# Patient Record
Sex: MALE | Race: WHITE | NOT HISPANIC OR LATINO | Employment: FULL TIME | URBAN - NONMETROPOLITAN AREA
[De-identification: names, ages, dates, MRNs, and addresses within clinical notes are randomized per-mention and may not be internally consistent; named-entity substitution may affect disease eponyms.]

---

## 2019-01-11 ENCOUNTER — TELEPHONE (OUTPATIENT)
Dept: SURGERY | Facility: CLINIC | Age: 54
End: 2019-01-11

## 2019-01-11 RX ORDER — POLYETHYLENE GLYCOL 1450
1 POWDER (GRAM) MISCELLANEOUS
Qty: 238 G | Refills: 0 | Status: SHIPPED | OUTPATIENT
Start: 2019-01-11 | End: 2019-01-11

## 2019-01-11 RX ORDER — BISACODYL 5 MG/1
10 TABLET, DELAYED RELEASE ORAL 2 TIMES DAILY
Qty: 4 TABLET | Refills: 0 | Status: SHIPPED | OUTPATIENT
Start: 2019-01-11 | End: 2019-01-12

## 2019-01-16 ENCOUNTER — PREP FOR SURGERY (OUTPATIENT)
Dept: OTHER | Facility: HOSPITAL | Age: 54
End: 2019-01-16

## 2019-01-16 DIAGNOSIS — Z12.11 SCREEN FOR COLON CANCER: Primary | ICD-10-CM

## 2019-01-21 PROBLEM — Z12.11 SCREEN FOR COLON CANCER: Status: ACTIVE | Noted: 2019-01-21

## 2019-01-28 ENCOUNTER — TELEPHONE (OUTPATIENT)
Dept: SURGERY | Facility: CLINIC | Age: 54
End: 2019-01-28

## 2019-01-29 RX ORDER — MULTIPLE VITAMINS W/ MINERALS TAB 9MG-400MCG
1 TAB ORAL DAILY
COMMUNITY

## 2019-01-29 RX ORDER — ASPIRIN 325 MG
325 TABLET ORAL DAILY
COMMUNITY
End: 2022-06-29 | Stop reason: HOSPADM

## 2019-01-29 RX ORDER — PAROXETINE 10 MG/1
10 TABLET, FILM COATED ORAL EVERY MORNING
COMMUNITY

## 2019-01-29 RX ORDER — VITAMIN B COMPLEX
1 CAPSULE ORAL DAILY
COMMUNITY

## 2019-01-30 ENCOUNTER — HOSPITAL ENCOUNTER (OUTPATIENT)
Facility: HOSPITAL | Age: 54
Setting detail: HOSPITAL OUTPATIENT SURGERY
Discharge: HOME | End: 2019-01-30
Attending: SURGERY | Admitting: SURGERY

## 2019-01-30 ENCOUNTER — ANESTHESIA (OUTPATIENT)
Dept: GASTROENTEROLOGY | Facility: HOSPITAL | Age: 54
End: 2019-01-30

## 2019-01-30 ENCOUNTER — ANESTHESIA EVENT (OUTPATIENT)
Dept: GASTROENTEROLOGY | Facility: HOSPITAL | Age: 54
End: 2019-01-30

## 2019-01-30 VITALS
SYSTOLIC BLOOD PRESSURE: 114 MMHG | RESPIRATION RATE: 18 BRPM | HEART RATE: 66 BPM | WEIGHT: 285 LBS | OXYGEN SATURATION: 98 % | TEMPERATURE: 97.8 F | HEIGHT: 75 IN | DIASTOLIC BLOOD PRESSURE: 71 MMHG | BODY MASS INDEX: 35.43 KG/M2

## 2019-01-30 DIAGNOSIS — Z12.11 SCREEN FOR COLON CANCER: ICD-10-CM

## 2019-01-30 PROCEDURE — 25010000002 PROPOFOL 10 MG/ML EMULSION: Performed by: NURSE ANESTHETIST, CERTIFIED REGISTERED

## 2019-01-30 PROCEDURE — S0260 H&P FOR SURGERY: HCPCS | Performed by: SURGERY

## 2019-01-30 RX ORDER — LIDOCAINE HYDROCHLORIDE 20 MG/ML
INJECTION, SOLUTION INFILTRATION; PERINEURAL AS NEEDED
Status: DISCONTINUED | OUTPATIENT
Start: 2019-01-30 | End: 2019-01-30 | Stop reason: SURG

## 2019-01-30 RX ORDER — SODIUM CHLORIDE, SODIUM LACTATE, POTASSIUM CHLORIDE, CALCIUM CHLORIDE 600; 310; 30; 20 MG/100ML; MG/100ML; MG/100ML; MG/100ML
1000 INJECTION, SOLUTION INTRAVENOUS CONTINUOUS
Status: DISCONTINUED | OUTPATIENT
Start: 2019-01-30 | End: 2019-01-30 | Stop reason: HOSPADM

## 2019-01-30 RX ORDER — SODIUM CHLORIDE 0.9 % (FLUSH) 0.9 %
3 SYRINGE (ML) INJECTION AS NEEDED
Status: DISCONTINUED | OUTPATIENT
Start: 2019-01-30 | End: 2019-01-30 | Stop reason: HOSPADM

## 2019-01-30 RX ORDER — PROPOFOL 10 MG/ML
VIAL (ML) INTRAVENOUS AS NEEDED
Status: DISCONTINUED | OUTPATIENT
Start: 2019-01-30 | End: 2019-01-30 | Stop reason: SURG

## 2019-01-30 RX ORDER — ONDANSETRON 2 MG/ML
4 INJECTION INTRAMUSCULAR; INTRAVENOUS ONCE AS NEEDED
Status: CANCELLED | OUTPATIENT
Start: 2019-01-30 | End: 2019-01-30

## 2019-01-30 RX ADMIN — LIDOCAINE HYDROCHLORIDE 100 MG: 20 INJECTION, SOLUTION INFILTRATION; PERINEURAL at 09:54

## 2019-01-30 RX ADMIN — SODIUM CHLORIDE, POTASSIUM CHLORIDE, SODIUM LACTATE AND CALCIUM CHLORIDE 1000 ML: 600; 310; 30; 20 INJECTION, SOLUTION INTRAVENOUS at 07:51

## 2019-01-30 RX ADMIN — PROPOFOL 400 MG: 10 INJECTION, EMULSION INTRAVENOUS at 10:05

## 2019-01-30 NOTE — ANESTHESIA PREPROCEDURE EVALUATION
Anesthesia Evaluation     Patient summary reviewed and Nursing notes reviewed   no history of anesthetic complications:  NPO Solid Status: > 8 hours  NPO Liquid Status: > 8 hours           Airway   Mallampati: II  TM distance: >3 FB  Possible difficult intubation and Large neck circumference  Dental    (+) partials    Pulmonary    (+) shortness of breath, sleep apnea, decreased breath sounds,   (-) not a smoker  Cardiovascular     PT is on anticoagulation therapy    CAD:  inc risk morbid obese.      Neuro/Psych  GI/Hepatic/Renal/Endo    (+) obesity, morbid obesity, GI bleeding, diabetes mellitus type 2 well controlled,     ROS Comment: Gastric sleeve    Musculoskeletal     (+) arthralgias, myalgias,   Abdominal   (+) obese,    Substance History      OB/GYN          Other                        Anesthesia Plan    ASA 3     MAC   (Risks and benefits discussed including risk of aspiration, recall and dental damage. All patient questions answered. Will continue with POC.)  intravenous induction   Anesthetic plan, all risks, benefits, and alternatives have been provided, discussed and informed consent has been obtained with: patient.

## 2019-01-30 NOTE — ANESTHESIA POSTPROCEDURE EVALUATION
Patient: Guanaco Herman    Procedure Summary     Date:  01/30/19 Room / Location:  [facility] ENDOSCOPY 3 / [facility] ENDOSCOPY    Anesthesia Start:  0951 Anesthesia Stop:  1015    Procedure:  COLONOSCOPY with hot forcep polypectomy (N/A ) Diagnosis:       Screen for colon cancer      (Screen for colon cancer [Z12.11])    Surgeon:  Ahmet Thomas MD Provider:  Aj Whittington CRNA    Anesthesia Type:  MAC ASA Status:  3          Anesthesia Type: MAC  Last vitals  BP   114/71 (01/30/19 1048)   Temp   97.8 °F (36.6 °C) (01/30/19 1018)   Pulse   66 (01/30/19 1048)   Resp   18 (01/30/19 1048)     SpO2   98 % (01/30/19 1048)     Post Anesthesia Care and Evaluation    Patient location during evaluation: PHASE II  Patient participation: complete - patient participated  Level of consciousness: awake  Pain score: 1  Pain management: adequate  Airway patency: patent  Anesthetic complications: No anesthetic complications  PONV Status: controlled  Cardiovascular status: acceptable and stable  Respiratory status: acceptable  Hydration status: acceptable

## 2019-02-04 LAB
LAB AP CASE REPORT: NORMAL
PATH REPORT.FINAL DX SPEC: NORMAL

## 2021-01-04 ENCOUNTER — TRANSCRIBE ORDERS (OUTPATIENT)
Dept: LAB | Facility: HOSPITAL | Age: 56
End: 2021-01-04

## 2021-01-04 ENCOUNTER — LAB (OUTPATIENT)
Dept: LAB | Facility: HOSPITAL | Age: 56
End: 2021-01-04

## 2021-01-04 DIAGNOSIS — K21.9 CHRONIC GERD: ICD-10-CM

## 2021-01-04 DIAGNOSIS — Z12.5 PROSTATE CANCER SCREENING: ICD-10-CM

## 2021-01-04 DIAGNOSIS — F32.A DEPRESSION, UNSPECIFIED DEPRESSION TYPE: ICD-10-CM

## 2021-01-04 DIAGNOSIS — K21.9 CHRONIC GERD: Primary | ICD-10-CM

## 2021-01-04 LAB
25(OH)D3 SERPL-MCNC: 25.7 NG/ML (ref 30–100)
ALBUMIN SERPL-MCNC: 3.9 G/DL (ref 3.5–5.2)
ALBUMIN/GLOB SERPL: 1.4 G/DL
ALP SERPL-CCNC: 106 U/L (ref 39–117)
ALT SERPL W P-5'-P-CCNC: 13 U/L (ref 1–41)
ANION GAP SERPL CALCULATED.3IONS-SCNC: 7.4 MMOL/L (ref 5–15)
AST SERPL-CCNC: 16 U/L (ref 1–40)
BACTERIA UR QL AUTO: ABNORMAL /HPF
BASOPHILS # BLD AUTO: 0.06 10*3/MM3 (ref 0–0.2)
BASOPHILS NFR BLD AUTO: 1 % (ref 0–1.5)
BILIRUB SERPL-MCNC: 0.3 MG/DL (ref 0–1.2)
BILIRUB UR QL STRIP: NEGATIVE
BUN SERPL-MCNC: 11 MG/DL (ref 6–20)
BUN/CREAT SERPL: 14.5 (ref 7–25)
CALCIUM SPEC-SCNC: 9 MG/DL (ref 8.6–10.5)
CHLORIDE SERPL-SCNC: 105 MMOL/L (ref 98–107)
CHOLEST SERPL-MCNC: 167 MG/DL (ref 0–200)
CLARITY UR: CLEAR
CO2 SERPL-SCNC: 28.6 MMOL/L (ref 22–29)
COLOR UR: YELLOW
CREAT SERPL-MCNC: 0.76 MG/DL (ref 0.76–1.27)
DEPRECATED RDW RBC AUTO: 41.6 FL (ref 37–54)
EOSINOPHIL # BLD AUTO: 0.34 10*3/MM3 (ref 0–0.4)
EOSINOPHIL NFR BLD AUTO: 5.8 % (ref 0.3–6.2)
ERYTHROCYTE [DISTWIDTH] IN BLOOD BY AUTOMATED COUNT: 13.4 % (ref 12.3–15.4)
GFR SERPL CREATININE-BSD FRML MDRD: 107 ML/MIN/1.73
GLOBULIN UR ELPH-MCNC: 2.7 GM/DL
GLUCOSE SERPL-MCNC: 110 MG/DL (ref 65–99)
GLUCOSE UR STRIP-MCNC: NEGATIVE MG/DL
HCT VFR BLD AUTO: 37.5 % (ref 37.5–51)
HDLC SERPL-MCNC: 57 MG/DL (ref 40–60)
HGB BLD-MCNC: 12.8 G/DL (ref 13–17.7)
HGB UR QL STRIP.AUTO: NEGATIVE
HYALINE CASTS UR QL AUTO: ABNORMAL /LPF
IMM GRANULOCYTES # BLD AUTO: 0.03 10*3/MM3 (ref 0–0.05)
IMM GRANULOCYTES NFR BLD AUTO: 0.5 % (ref 0–0.5)
KETONES UR QL STRIP: ABNORMAL
LDLC SERPL CALC-MCNC: 79 MG/DL (ref 0–100)
LDLC/HDLC SERPL: 1.29 {RATIO}
LEUKOCYTE ESTERASE UR QL STRIP.AUTO: NEGATIVE
LYMPHOCYTES # BLD AUTO: 1.58 10*3/MM3 (ref 0.7–3.1)
LYMPHOCYTES NFR BLD AUTO: 26.7 % (ref 19.6–45.3)
MCH RBC QN AUTO: 29.4 PG (ref 26.6–33)
MCHC RBC AUTO-ENTMCNC: 34.1 G/DL (ref 31.5–35.7)
MCV RBC AUTO: 86 FL (ref 79–97)
MONOCYTES # BLD AUTO: 0.48 10*3/MM3 (ref 0.1–0.9)
MONOCYTES NFR BLD AUTO: 8.1 % (ref 5–12)
NEUTROPHILS NFR BLD AUTO: 3.42 10*3/MM3 (ref 1.7–7)
NEUTROPHILS NFR BLD AUTO: 57.9 % (ref 42.7–76)
NITRITE UR QL STRIP: NEGATIVE
NRBC BLD AUTO-RTO: 0 /100 WBC (ref 0–0.2)
PH UR STRIP.AUTO: 6.5 [PH] (ref 5–8)
PLATELET # BLD AUTO: 204 10*3/MM3 (ref 140–450)
PMV BLD AUTO: 11 FL (ref 6–12)
POTASSIUM SERPL-SCNC: 4.2 MMOL/L (ref 3.5–5.2)
PROT SERPL-MCNC: 6.6 G/DL (ref 6–8.5)
PROT UR QL STRIP: NEGATIVE
PSA SERPL-MCNC: 2.19 NG/ML (ref 0–4)
RBC # BLD AUTO: 4.36 10*6/MM3 (ref 4.14–5.8)
RBC # UR: ABNORMAL /HPF
REF LAB TEST METHOD: ABNORMAL
SODIUM SERPL-SCNC: 141 MMOL/L (ref 136–145)
SP GR UR STRIP: 1.02 (ref 1–1.03)
SQUAMOUS #/AREA URNS HPF: ABNORMAL /HPF
T3 SERPL-MCNC: 116 NG/DL (ref 80–200)
T4 FREE SERPL-MCNC: 0.91 NG/DL (ref 0.93–1.7)
TRIGL SERPL-MCNC: 182 MG/DL (ref 0–150)
TSH SERPL DL<=0.05 MIU/L-ACNC: 2.34 UIU/ML (ref 0.27–4.2)
UROBILINOGEN UR QL STRIP: ABNORMAL
VLDLC SERPL-MCNC: 31 MG/DL (ref 5–40)
WBC # BLD AUTO: 5.91 10*3/MM3 (ref 3.4–10.8)
WBC UR QL AUTO: ABNORMAL /HPF

## 2021-01-04 PROCEDURE — G0103 PSA SCREENING: HCPCS

## 2021-01-04 PROCEDURE — 80061 LIPID PANEL: CPT

## 2021-01-04 PROCEDURE — 81001 URINALYSIS AUTO W/SCOPE: CPT

## 2021-01-04 PROCEDURE — 85025 COMPLETE CBC W/AUTO DIFF WBC: CPT

## 2021-01-04 PROCEDURE — 84443 ASSAY THYROID STIM HORMONE: CPT

## 2021-01-04 PROCEDURE — 80053 COMPREHEN METABOLIC PANEL: CPT

## 2021-01-04 PROCEDURE — 82306 VITAMIN D 25 HYDROXY: CPT

## 2021-01-04 PROCEDURE — 36415 COLL VENOUS BLD VENIPUNCTURE: CPT

## 2021-01-04 PROCEDURE — 84480 ASSAY TRIIODOTHYRONINE (T3): CPT

## 2021-01-04 PROCEDURE — 84439 ASSAY OF FREE THYROXINE: CPT

## 2021-03-10 ENCOUNTER — IMMUNIZATION (OUTPATIENT)
Dept: VACCINE CLINIC | Facility: HOSPITAL | Age: 56
End: 2021-03-10

## 2021-03-10 PROCEDURE — 0001A: CPT | Performed by: INTERNAL MEDICINE

## 2021-03-10 PROCEDURE — 91300 HC SARSCOV02 VAC 30MCG/0.3ML IM: CPT | Performed by: INTERNAL MEDICINE

## 2021-04-06 ENCOUNTER — IMMUNIZATION (OUTPATIENT)
Dept: VACCINE CLINIC | Facility: HOSPITAL | Age: 56
End: 2021-04-06

## 2021-04-06 PROCEDURE — 91300 HC SARSCOV02 VAC 30MCG/0.3ML IM: CPT | Performed by: INTERNAL MEDICINE

## 2021-04-06 PROCEDURE — 0002A: CPT | Performed by: INTERNAL MEDICINE

## 2022-05-03 ENCOUNTER — TELEPHONE (OUTPATIENT)
Dept: SURGERY | Facility: CLINIC | Age: 57
End: 2022-05-03

## 2022-05-31 RX ORDER — POLYETHYLENE GLYCOL 3350 17 G/17G
238 POWDER, FOR SOLUTION ORAL ONCE
Qty: 238 G | Refills: 0 | Status: SHIPPED | OUTPATIENT
Start: 2022-05-31 | End: 2022-05-31

## 2022-05-31 RX ORDER — BISACODYL 5 MG
TABLET, DELAYED RELEASE (ENTERIC COATED) ORAL
Qty: 4 TABLET | Refills: 0 | Status: SHIPPED | OUTPATIENT
Start: 2022-05-31

## 2022-05-31 NOTE — TELEPHONE ENCOUNTER
"Pt scheduled for open access screening colonoscopy @ [facility] 06/29/2022.  Pt stated that there have been no changes in his health history since his last colonoscopy and he is \"not having any problems at this time\".  "

## 2022-06-01 ENCOUNTER — PREP FOR SURGERY (OUTPATIENT)
Dept: OTHER | Facility: HOSPITAL | Age: 57
End: 2022-06-01

## 2022-06-01 DIAGNOSIS — Z12.11 SCREENING FOR COLON CANCER: Primary | ICD-10-CM

## 2022-06-24 ENCOUNTER — TELEPHONE (OUTPATIENT)
Dept: SURGERY | Facility: CLINIC | Age: 57
End: 2022-06-24

## 2022-06-24 NOTE — TELEPHONE ENCOUNTER
I talked with pt, he wanted to know if he can return to work the day after his colonoscopy, I told him he may return to work the day after his colonoscopy.

## 2022-06-27 RX ORDER — ASPIRIN 81 MG/1
81 TABLET, CHEWABLE ORAL DAILY
COMMUNITY

## 2022-06-27 RX ORDER — ROSUVASTATIN CALCIUM 10 MG/1
10 TABLET, COATED ORAL DAILY
COMMUNITY

## 2022-06-29 ENCOUNTER — HOSPITAL ENCOUNTER (OUTPATIENT)
Facility: HOSPITAL | Age: 57
Setting detail: HOSPITAL OUTPATIENT SURGERY
Discharge: HOME | End: 2022-06-29
Attending: SURGERY | Admitting: SURGERY

## 2022-06-29 ENCOUNTER — ANESTHESIA EVENT (OUTPATIENT)
Dept: GASTROENTEROLOGY | Facility: HOSPITAL | Age: 57
End: 2022-06-29

## 2022-06-29 ENCOUNTER — ANESTHESIA (OUTPATIENT)
Dept: GASTROENTEROLOGY | Facility: HOSPITAL | Age: 57
End: 2022-06-29

## 2022-06-29 VITALS
BODY MASS INDEX: 36.31 KG/M2 | WEIGHT: 292 LBS | HEIGHT: 75 IN | SYSTOLIC BLOOD PRESSURE: 118 MMHG | DIASTOLIC BLOOD PRESSURE: 62 MMHG | OXYGEN SATURATION: 97 % | RESPIRATION RATE: 16 BRPM | TEMPERATURE: 97.1 F | HEART RATE: 60 BPM

## 2022-06-29 DIAGNOSIS — Z12.11 SCREENING FOR COLON CANCER: ICD-10-CM

## 2022-06-29 PROCEDURE — 25010000002 FENTANYL CITRATE (PF) 50 MCG/ML SOLUTION: Performed by: NURSE ANESTHETIST, CERTIFIED REGISTERED

## 2022-06-29 PROCEDURE — 25010000002 PROPOFOL 10 MG/ML EMULSION: Performed by: NURSE ANESTHETIST, CERTIFIED REGISTERED

## 2022-06-29 PROCEDURE — 25010000002 MIDAZOLAM PER 1MG: Performed by: NURSE ANESTHETIST, CERTIFIED REGISTERED

## 2022-06-29 PROCEDURE — 45384 COLONOSCOPY W/LESION REMOVAL: CPT | Performed by: SURGERY

## 2022-06-29 PROCEDURE — 45380 COLONOSCOPY AND BIOPSY: CPT | Performed by: SURGERY

## 2022-06-29 PROCEDURE — S0260 H&P FOR SURGERY: HCPCS | Performed by: SURGERY

## 2022-06-29 RX ORDER — SODIUM CHLORIDE, SODIUM LACTATE, POTASSIUM CHLORIDE, CALCIUM CHLORIDE 600; 310; 30; 20 MG/100ML; MG/100ML; MG/100ML; MG/100ML
INJECTION, SOLUTION INTRAVENOUS CONTINUOUS PRN
Status: DISCONTINUED | OUTPATIENT
Start: 2022-06-29 | End: 2022-06-29 | Stop reason: SURG

## 2022-06-29 RX ORDER — SODIUM CHLORIDE 0.9 % (FLUSH) 0.9 %
10 SYRINGE (ML) INJECTION AS NEEDED
Status: DISCONTINUED | OUTPATIENT
Start: 2022-06-29 | End: 2022-06-29 | Stop reason: HOSPADM

## 2022-06-29 RX ORDER — FENTANYL CITRATE 50 UG/ML
INJECTION, SOLUTION INTRAMUSCULAR; INTRAVENOUS AS NEEDED
Status: DISCONTINUED | OUTPATIENT
Start: 2022-06-29 | End: 2022-06-29 | Stop reason: SURG

## 2022-06-29 RX ORDER — SODIUM CHLORIDE, SODIUM LACTATE, POTASSIUM CHLORIDE, CALCIUM CHLORIDE 600; 310; 30; 20 MG/100ML; MG/100ML; MG/100ML; MG/100ML
1000 INJECTION, SOLUTION INTRAVENOUS CONTINUOUS
Status: DISCONTINUED | OUTPATIENT
Start: 2022-06-29 | End: 2022-06-29 | Stop reason: HOSPADM

## 2022-06-29 RX ORDER — PROPOFOL 10 MG/ML
VIAL (ML) INTRAVENOUS AS NEEDED
Status: DISCONTINUED | OUTPATIENT
Start: 2022-06-29 | End: 2022-06-29 | Stop reason: SURG

## 2022-06-29 RX ORDER — MIDAZOLAM HYDROCHLORIDE 2 MG/2ML
INJECTION, SOLUTION INTRAMUSCULAR; INTRAVENOUS AS NEEDED
Status: DISCONTINUED | OUTPATIENT
Start: 2022-06-29 | End: 2022-06-29 | Stop reason: SURG

## 2022-06-29 RX ORDER — KETAMINE HCL IN NACL, ISO-OSM 100MG/10ML
SYRINGE (ML) INJECTION AS NEEDED
Status: DISCONTINUED | OUTPATIENT
Start: 2022-06-29 | End: 2022-06-29 | Stop reason: SURG

## 2022-06-29 RX ADMIN — PROPOFOL 30 MG: 10 INJECTION, EMULSION INTRAVENOUS at 07:41

## 2022-06-29 RX ADMIN — SODIUM CHLORIDE, POTASSIUM CHLORIDE, SODIUM LACTATE AND CALCIUM CHLORIDE: 600; 310; 30; 20 INJECTION, SOLUTION INTRAVENOUS at 07:14

## 2022-06-29 RX ADMIN — FENTANYL CITRATE 50 MCG: 50 INJECTION, SOLUTION INTRAMUSCULAR; INTRAVENOUS at 07:41

## 2022-06-29 RX ADMIN — MIDAZOLAM HYDROCHLORIDE 2 MG: 1 INJECTION, SOLUTION INTRAMUSCULAR; INTRAVENOUS at 07:35

## 2022-06-29 RX ADMIN — Medication 25 MG: at 07:38

## 2022-06-29 RX ADMIN — FENTANYL CITRATE 50 MCG: 50 INJECTION, SOLUTION INTRAMUSCULAR; INTRAVENOUS at 07:36

## 2022-06-29 RX ADMIN — SODIUM CHLORIDE, POTASSIUM CHLORIDE, SODIUM LACTATE AND CALCIUM CHLORIDE 1000 ML: 600; 310; 30; 20 INJECTION, SOLUTION INTRAVENOUS at 06:35

## 2022-06-29 RX ADMIN — PROPOFOL 30 MG: 10 INJECTION, EMULSION INTRAVENOUS at 07:44

## 2022-06-29 RX ADMIN — PROPOFOL 30 MG: 10 INJECTION, EMULSION INTRAVENOUS at 07:39

## 2022-06-29 NOTE — ANESTHESIA PREPROCEDURE EVALUATION
Anesthesia Evaluation     Patient summary reviewed and Nursing notes reviewed   no history of anesthetic complications:  NPO Solid Status: > 8 hours  NPO Liquid Status: > 8 hours           Airway   Mallampati: II  TM distance: >3 FB  Possible difficult intubation, Large neck circumference and Difficult intubation highly probable  Dental    (+) partials    Pulmonary    (+) shortness of breath, sleep apnea, decreased breath sounds,   (-) not a smoker  Cardiovascular     PT is on anticoagulation therapy    (+) hypertension, [place], PVD, hyperlipidemia,   CAD:  inc risk morbid obese.      Neuro/Psych  GI/Hepatic/Renal/Endo    (+) obesity, morbid obesity, GI bleeding , diabetes mellitus type 2 well controlled,     ROS Comment: Gastric sleeve    Musculoskeletal     (+) arthralgias, back pain, chronic pain, myalgias,   Abdominal   (+) obese,    Substance History      OB/GYN          Other                          Anesthesia Plan    ASA 3     MAC     (Risks and benefits discussed including risk of aspiration, recall and dental damage. All patient questions answered. Will continue with POC.)  intravenous induction     Anesthetic plan, risks, benefits, and alternatives have been provided, discussed and informed consent has been obtained with: patient.

## 2022-06-29 NOTE — ANESTHESIA POSTPROCEDURE EVALUATION
Patient: Guanaco Herman    Procedure Summary     Date: 06/29/22 Room / Location: [facility] ENDOSCOPY 3 / [facility] ENDOSCOPY    Anesthesia Start: 0729 Anesthesia Stop:     Procedure: COLONOSCOPY, polypectomy (N/A ) Diagnosis:       Screening for colon cancer      (Screening for colon cancer [Z12.11])    Surgeons: Ahmet Thomas MD Provider: Ramiro Hook CRNA    Anesthesia Type: MAC ASA Status: 3          Anesthesia Type: MAC    Vitals  No vitals data found for the desired time range.          Post Anesthesia Care and Evaluation    Patient location during evaluation: PHASE II  Patient participation: complete - patient participated  Level of consciousness: awake  Pain score: 0  Pain management: adequate    Airway patency: patent  Anesthetic complications: No anesthetic complications  PONV Status: none  Cardiovascular status: acceptable  Respiratory status: acceptable  Hydration status: acceptable    Comments: vsss resp spont, reflexes intact, responsive, report given to pacu nurse.  See R.N. note for postop vital signs.

## 2022-06-30 LAB — REF LAB TEST METHOD: NORMAL

## 2025-04-01 ENCOUNTER — TELEPHONE (OUTPATIENT)
Age: 60
End: 2025-04-01

## 2025-04-01 NOTE — TELEPHONE ENCOUNTER
Caller: Guanaco Herman    Relationship to patient: Self    Best call back number: [phone]     Chief complaint: NO CHIEF COMPLAINT - REQUESTING PHYSICAL     Type of visit: NEW PATIENT WITH DR RUVALCABA     Requested date: ASAP PLEASE - WHEN AVAILABLE      If rescheduling, when is the original appointment: N/A NO AVAILABILITY     Additional notes:THE PATIENT STATED THAT HIS PROVIDER RETIRED AND THAT DR RUVALCABA WAS HIGHLY RECOMMENDED BY SOMEONE (REPORTS HE IS FROM [place], [place]).     PLEASE CALL AND ADVISE.

## 2025-05-13 ENCOUNTER — OFFICE VISIT (OUTPATIENT)
Age: 60
End: 2025-05-13
Payer: COMMERCIAL

## 2025-05-13 VITALS
SYSTOLIC BLOOD PRESSURE: 130 MMHG | DIASTOLIC BLOOD PRESSURE: 90 MMHG | HEART RATE: 67 BPM | WEIGHT: 304 LBS | HEIGHT: 75 IN | OXYGEN SATURATION: 97 % | BODY MASS INDEX: 37.8 KG/M2

## 2025-05-13 DIAGNOSIS — M15.0 PRIMARY OSTEOARTHRITIS INVOLVING MULTIPLE JOINTS: ICD-10-CM

## 2025-05-13 DIAGNOSIS — Z12.5 PROSTATE CANCER SCREENING: ICD-10-CM

## 2025-05-13 DIAGNOSIS — F41.1 GENERALIZED ANXIETY DISORDER: ICD-10-CM

## 2025-05-13 DIAGNOSIS — R03.0 ELEVATED BLOOD PRESSURE READING WITHOUT DIAGNOSIS OF HYPERTENSION: Primary | ICD-10-CM

## 2025-05-13 DIAGNOSIS — M25.50 POLYARTHRALGIA: ICD-10-CM

## 2025-05-13 DIAGNOSIS — E78.5 DYSLIPIDEMIA: ICD-10-CM

## 2025-05-13 LAB
BASOPHILS # BLD AUTO: 0.06 10*3/MM3 (ref 0–0.2)
BASOPHILS NFR BLD AUTO: 0.8 % (ref 0–1.5)
DEPRECATED RDW RBC AUTO: 43.6 FL (ref 37–54)
EOSINOPHIL # BLD AUTO: 0.47 10*3/MM3 (ref 0–0.4)
EOSINOPHIL NFR BLD AUTO: 6.4 % (ref 0.3–6.2)
ERYTHROCYTE [DISTWIDTH] IN BLOOD BY AUTOMATED COUNT: 15 % (ref 12.3–15.4)
HCT VFR BLD AUTO: 35.4 % (ref 37.5–51)
HGB BLD-MCNC: 11.4 G/DL (ref 13–17.7)
IMM GRANULOCYTES # BLD AUTO: 0.04 10*3/MM3 (ref 0–0.05)
IMM GRANULOCYTES NFR BLD AUTO: 0.5 % (ref 0–0.5)
LYMPHOCYTES # BLD AUTO: 1.89 10*3/MM3 (ref 0.7–3.1)
LYMPHOCYTES NFR BLD AUTO: 25.8 % (ref 19.6–45.3)
MCH RBC QN AUTO: 25.7 PG (ref 26.6–33)
MCHC RBC AUTO-ENTMCNC: 32.2 G/DL (ref 31.5–35.7)
MCV RBC AUTO: 79.9 FL (ref 79–97)
MONOCYTES # BLD AUTO: 0.59 10*3/MM3 (ref 0.1–0.9)
MONOCYTES NFR BLD AUTO: 8.1 % (ref 5–12)
NEUTROPHILS NFR BLD AUTO: 4.27 10*3/MM3 (ref 1.7–7)
NEUTROPHILS NFR BLD AUTO: 58.4 % (ref 42.7–76)
NRBC BLD AUTO-RTO: 0 /100 WBC (ref 0–0.2)
PLATELET # BLD AUTO: 244 10*3/MM3 (ref 140–450)
PMV BLD AUTO: 11.1 FL (ref 6–12)
RBC # BLD AUTO: 4.43 10*6/MM3 (ref 4.14–5.8)
WBC NRBC COR # BLD AUTO: 7.32 10*3/MM3 (ref 3.4–10.8)

## 2025-05-13 PROCEDURE — 85025 COMPLETE CBC W/AUTO DIFF WBC: CPT | Performed by: FAMILY MEDICINE

## 2025-05-13 PROCEDURE — 99204 OFFICE O/P NEW MOD 45 MIN: CPT | Performed by: FAMILY MEDICINE

## 2025-05-13 PROCEDURE — 80053 COMPREHEN METABOLIC PANEL: CPT | Performed by: FAMILY MEDICINE

## 2025-05-13 PROCEDURE — G0103 PSA SCREENING: HCPCS | Performed by: FAMILY MEDICINE

## 2025-05-13 RX ORDER — ROSUVASTATIN CALCIUM 10 MG/1
10 TABLET, COATED ORAL DAILY
Qty: 90 TABLET | Refills: 3 | Status: SHIPPED | OUTPATIENT
Start: 2025-05-13

## 2025-05-13 RX ORDER — PAROXETINE 10 MG/1
10 TABLET, FILM COATED ORAL EVERY MORNING
Qty: 90 TABLET | Refills: 3 | Status: SHIPPED | OUTPATIENT
Start: 2025-05-13

## 2025-05-13 NOTE — PROGRESS NOTES
New Patient History and Physical      Referring Physician: No ref. provider found    Chief Complaint:    Chief Complaint   Patient presents with    Establish Care       History of Present Illness:   Here to establish with a new primary care physician for chronic comorbid medical management.    Denies chest pain, syncope, palpitations or vertigo.  Denies fever, chills or night sweats.  Maintains an active lifestyle, balanced dietary intake; reports good hydration habits.  Denies hematuria/dysuria.  Denies any BRB/BTS.  No new rashes.  Denies orthopnea, epistaxis or hemoptysis.    Patient has generalized anxiety symptoms, as well aslongstanding polyarthralgia and dyslipidemia.    Subjective     Review of Systems     Constitutional: Negative for fever. Negative for chills, diaphoresis, fatigue and unexpected weight change.   HENT: No dysphagia; no changes to vision/hearing/smell/taste; no epistaxis  Eyes: Negative for redness and visual disturbance.   Respiratory: negative for shortness of breath. Negative for chest pain . Negative for cough and chest tightness.   Cardiovascular: Negative for chest pain and palpitations.   Gastrointestinal: Negative for abdominal distention, abdominal pain and blood in stool.   Endocrine: Negative for cold intolerance and heat intolerance.   Genitourinary: Negative for difficulty urinating, dysuria and frequency.   Musculoskeletal: Chronic arthralgias, back pain and myalgias.   Skin: Negative for color change, rash and wound.   Neurological: Negative for syncope, weakness and headaches.   Hematological: Negative for adenopathy. Does not bruise/bleed easily.   Psychiatric/Behavioral: Negative for confusion.  Chronic generalized anxiety.    The following portions of the patient's history were reviewed and updated as appropriate: allergies, current medications, past family history, past medical history, past social history, past surgical history and problem list.    Past Medical  History:   Past Medical History:   Diagnosis Date    Allergic     Seasonal    Anxiety     Constipation     Elevated cholesterol     GERD (gastroesophageal reflux disease) 2020    Pantoprazole    Hemorrhoid     History of cardiovascular stress test     REPORTS HX OF EXERCISE STRESS TEST AROUND 2014.  REPORTS ALL WAS WNL'S.    History of fracture     REPORTS AT 18 MONTHS OF AGE HE HAD BILATERAL LEGS BROKEN AND RESET    History of kidney stones     REPORTS NO SURGICAL INTERVENTION WAS REQUIRED    Kidney stone 2005    I’ve had 5 kidney stone attacks    Low back pain 10/2024    More leg but also lower back    Obesity 1975    Rectal bleeding     Seasonal allergies     Visual impairment 2000    Glasses    Wears glasses     Wears partial dentures     PARTIAL PLATE UPPER MOUTH - INSTRUCTED NO ADHESIVES THE DOS       Past Surgical History:  Past Surgical History:   Procedure Laterality Date    BARIATRIC SURGERY  2012    Have maintained 100lb lose since surgery    COLONOSCOPY N/A 01/30/2019    Procedure: COLONOSCOPY with hot forcep polypectomy;  Surgeon: Ahmet Thomas MD;  Location: [facility] ENDOSCOPY;  Service: Gastroenterology    COLONOSCOPY N/A 06/29/2022    Procedure: COLONOSCOPY, polypectomy;  Surgeon: Ahmet Thomas MD;  Location: [facility] ENDOSCOPY;  Service: Gastroenterology;  Laterality: N/A;    ENDOSCOPY      GASTRIC BYPASS  06/27/2022    GASTRIC SLEEVE LAPAROSCOPIC  2014    HAND SURGERY Right        Family History: family history includes Cancer in his mother; Heart disease in his brother and father; Hyperlipidemia in his brother and father; Hypertension in his brother and father; Stroke in his brother.    Social History:  reports that he has never smoked. He has never been exposed to tobacco smoke. His smokeless tobacco use includes chew. He reports that he does not drink alcohol and does not use drugs.    Medications:    Current Outpatient Medications:     aspirin 81 MG chewable tablet, Chew 1 tablet Daily.,  "Disp: , Rfl:     B Complex Vitamins (VITAMIN B COMPLEX) capsule capsule, Take 1 capsule by mouth Daily., Disp: , Rfl:     Calcium Carbonate-Vitamin D (CALCIUM 500/D PO), Take 1,000 mg by mouth Daily., Disp: , Rfl:     Cholecalciferol (VITAMIN D3 PO), Take 600 mg by mouth Every Morning., Disp: , Rfl:     Multiple Vitamins-Minerals (MULTIVITAMIN WITH MINERALS) tablet tablet, Take 1 tablet by mouth Daily., Disp: , Rfl:     PARoxetine (PAXIL) 10 MG tablet, Take 1 tablet by mouth Every Morning., Disp: 90 tablet, Rfl: 3    rosuvastatin (CRESTOR) 10 MG tablet, Take 1 tablet by mouth Daily., Disp: 90 tablet, Rfl: 3    bisacodyl (Dulcolax) 5 MG EC tablet, Take 2 @ 3pm, 2 @ 7 pm day prior to colonoscopy (Patient not taking: Reported on 5/13/2025), Disp: 4 tablet, Rfl: 0    Allergies:  No Known Allergies    Objective     Physical Exam:  Vital Signs: /90   Pulse 67   Ht 190.5 cm (75\")   Wt (!) 138 kg (304 lb)   SpO2 97%   BMI 38.00 kg/m²    Class 2 Severe Obesity (BMI >=35 and <=39.9). Obesity-related health conditions include the following: dyslipidemias and osteoarthritis. Obesity is newly identified. BMI is is above average; BMI management plan is completed. We discussed portion control and increasing exercise.    General Appearance: alert, oriented x 3, no acute distress.  Skin: warm and dry.   HEENT: Atraumatic.  pupils round and reactive to light and accommodation, oral mucosa pink and moist.  Nares patent without epistaxis.  External auditory canals are patent tympanic membranes intact.  Neck: supple, no JVD, trachea midline.  No thyromegaly  Lungs: CTA, unlabored breathing effort.  Heart: RRR, normal S1 and S2, no S3, no rub.  Abdomen: soft, non-tender, no palpable bladder, present bowel sounds to auscultation ×4.  No guarding or rigidity.  Extremities: no clubbing, cyanosis or edema.  Good range of motion actively and passively.  Symmetric muscle strength and development  Neuro: normal speech and mental " status.  Cranial nerves II through XII intact.  No anosmia. DTR 2+; proprioception intact.  No focal motor/sensory deficits.    Advance Care Planning   ACP discussion was held with the patient during this visit. Patient does not have an advance directive, information provided.       Assessment / Plan     Assessment/Plan:   Diagnoses and all orders for this visit:    1. Elevated blood pressure reading without diagnosis of hypertension (Primary)  -     Ambulatory Referral to Cardiology  -     Comprehensive metabolic panel  -     CBC w AUTO Differential    2. Dyslipidemia  -     Ambulatory Referral to Cardiology  -     Comprehensive metabolic panel  -     rosuvastatin (CRESTOR) 10 MG tablet; Take 1 tablet by mouth Daily.  Dispense: 90 tablet; Refill: 3    3. Polyarthralgia    4. Primary osteoarthritis involving multiple joints    5. Prostate cancer screening  -     PSA Screen    6. Generalized anxiety disorder  -     PARoxetine (PAXIL) 10 MG tablet; Take 1 tablet by mouth Every Morning.  Dispense: 90 tablet; Refill: 3    Patient demonstrates an elevation to blood pressure, likely multifactorial.  I have recommended a surveillance CBC/CMP today.  Due to his underlying dyslipidemia, risk stratification would be in order.  I have placed a referral to be seen by cardiology.    Continue statin therapy, as well as low-cholesterol dietary intake.    Surveillance PSA for prostate cancer screening.    Refill provided for paroxetine, I have asked patient to notify the office should he develop any ill effects to this medication.  Discussed need for stress/anxiety reducing techniques such as prayer/meditation/breathing and counting exercises and avoidance of stress producing environments/situations; will follow clinically.          Discussion Summary:  Discussed plan of care in detail with pt today; pt verb understanding and agrees.    I spent 45 minutes caring for Guanaco on this date of service. This time includes time spent by  me in the following activities:preparing for the visit, reviewing tests, obtaining and/or reviewing a separately obtained history, performing a medically appropriate examination and/or evaluation , counseling and educating the patient/family/caregiver, ordering medications, tests, or procedures, referring and communicating with other health care professionals , documenting information in the medical record, and care coordination    I have reviewed and updated all copied forward information, as appropriate.  I attest to the accuracy and relevance of any unchanged information.    Follow up:  Return in about 6 months (around 11/13/2025) for Recheck.     There are no Patient Instructions on file for this visit.        Ramesh Mcgrath DO  06/02/25  13:53 EDT

## 2025-05-14 LAB
ALBUMIN SERPL-MCNC: 4.2 G/DL (ref 3.5–5.2)
ALBUMIN/GLOB SERPL: 1.5 G/DL
ALP SERPL-CCNC: 94 U/L (ref 39–117)
ALT SERPL W P-5'-P-CCNC: 17 U/L (ref 1–41)
ANION GAP SERPL CALCULATED.3IONS-SCNC: 8.9 MMOL/L (ref 5–15)
AST SERPL-CCNC: 25 U/L (ref 1–40)
BILIRUB SERPL-MCNC: 0.3 MG/DL (ref 0–1.2)
BUN SERPL-MCNC: 16 MG/DL (ref 6–20)
BUN/CREAT SERPL: 19.3 (ref 7–25)
CALCIUM SPEC-SCNC: 9.5 MG/DL (ref 8.6–10.5)
CHLORIDE SERPL-SCNC: 104 MMOL/L (ref 98–107)
CO2 SERPL-SCNC: 26.1 MMOL/L (ref 22–29)
CREAT SERPL-MCNC: 0.83 MG/DL (ref 0.76–1.27)
EGFRCR SERPLBLD CKD-EPI 2021: 100.8 ML/MIN/1.73
GLOBULIN UR ELPH-MCNC: 2.8 GM/DL
GLUCOSE SERPL-MCNC: 85 MG/DL (ref 65–99)
POTASSIUM SERPL-SCNC: 4.2 MMOL/L (ref 3.5–5.2)
PROT SERPL-MCNC: 7 G/DL (ref 6–8.5)
PSA SERPL-MCNC: 1.73 NG/ML (ref 0–4)
SODIUM SERPL-SCNC: 139 MMOL/L (ref 136–145)

## 2025-05-16 ENCOUNTER — TELEPHONE (OUTPATIENT)
Dept: SURGERY | Facility: CLINIC | Age: 60
End: 2025-05-16
Payer: COMMERCIAL

## 2025-05-16 NOTE — TELEPHONE ENCOUNTER
TALKED TO PATIENT AND HE WOULD LIKE A CALL MONDAY TO GET COLONOSCOPY SCHEDULED. ALL INFO IS CORRECT.

## 2025-05-16 NOTE — LETTER
May 20, 2025    Guanaco Herman  [address] [place] [address]  [id number]      Dear Mr. Herman      We have been unsuccessful in reaching you regarding scheduling a colonoscopy.  Please call our office to schedule this very important test.    If you have changed physicians or have had this procedure done elsewhere, please let us know so we can maintain accuracy in your medical record.      Your health and well-being is our primary concern.   We can be reached by phone at [phone], Option 4 or you can send us a message via Mobile System 7 at [email or web address].   This will be your final reminder.    Please call us if you have further questions.  We look forward to hearing from you!    Sincerely,    [facility]  Ahmet Thomas MD

## 2025-05-19 NOTE — TELEPHONE ENCOUNTER
I called pt regarding scheduling screening colonoscopy, I left a message on voice mail asking him to return my call.

## 2025-05-22 ENCOUNTER — PATIENT ROUNDING (BHMG ONLY) (OUTPATIENT)
Age: 60
End: 2025-05-22
Payer: COMMERCIAL

## 2025-05-22 NOTE — PROGRESS NOTES
A "[employer] message has been sent to the patient for PATIENT ROUNDING with [facility].

## 2025-05-30 ENCOUNTER — TELEPHONE (OUTPATIENT)
Dept: SURGERY | Facility: CLINIC | Age: 60
End: 2025-05-30
Payer: COMMERCIAL

## 2025-05-30 ENCOUNTER — TELEPHONE (OUTPATIENT)
Age: 60
End: 2025-05-30

## 2025-05-30 NOTE — TELEPHONE ENCOUNTER
Caller: Guanaco Herman    Relationship: Self    Best call back number: [phone]     What is the medical concern/diagnosis: PAIN IN RIGHT HIP AND DONE TO THE KNEE    What specialty or service is being requested: PROVIDER RECOMMENDATION    What is the provider, practice or medical service name: PROVIDER RECOMMENDATION    Any additional details: PATIENT STATES HE SPOKE TO DR. RUVALCABA ABOUT THIS PAIN. HE WOULD LIKE TO KNOW IF HE CAN GET A REFERRAL TO SEE A SPECIALIST OR IF HE NEEDS TO COME IN AND SEE DR. RUVALCABA AGAIN

## 2025-05-30 NOTE — TELEPHONE ENCOUNTER
Caller: Guanaco Herman    Relationship: Self    Best call back number: [phone]     What is the best time to reach you: ANYTIME    Who are you requesting to speak with (clinical staff, provider,  specific staff member): CLINICAL    What was the call regarding: PATIENT STATES HE SPOKE TO DR. RUVALCABA AT HIS APPT REGARDING A COLONOSCOPY. HE STATES THAT HE WAS TOLD THAT HE WOULD NOT NEED ONE AT THIS TIME. HE THEN RECEIVED A CALL FROM DR. WHITFIELD AND THEY ARE TELLING HIM HE IS DUE ONE. PLEASE CALL BACK TO ADVISE     Tranexamic Acid Pregnancy And Lactation Text: It is unknown if this medication is safe during pregnancy or breast feeding.

## 2025-05-30 NOTE — TELEPHONE ENCOUNTER
Called patient to make him aware that Dr Butler note did say to repeat in 3 years for surveillance so the patient will contact them to schedule. He is also aware I have sent his other message to Dr Mcgrath about referrals.

## 2025-05-30 NOTE — TELEPHONE ENCOUNTER
CALLED PATIENT AND HE WOULD LIKE TO SCHEDULE HIS 3 YEARCOLONOSCOPY AND WOULD LIKE SOMEONE TO CALL HIM BACK. SENT LETTER

## 2025-06-02 PROBLEM — F41.1 GENERALIZED ANXIETY DISORDER: Status: ACTIVE | Noted: 2025-06-02

## 2025-06-02 NOTE — TELEPHONE ENCOUNTER
I talked with pt regarding open access screening colonoscopy and upcoming appt with cardiologist, Dr. De La Rosa scheduled for 06/17/2025. Pt informed that he will need cardiac clearance from Dr. De La Rosa prior to scheduling colonoscopy, he voiced understanding and stated that he will call our office after he sees Dr. De La Rosa.

## 2025-06-11 DIAGNOSIS — M16.11 PRIMARY OSTEOARTHRITIS OF RIGHT HIP: ICD-10-CM

## 2025-06-11 DIAGNOSIS — M25.551 ARTHRALGIA OF RIGHT HIP: Primary | ICD-10-CM

## 2025-06-17 ENCOUNTER — OFFICE VISIT (OUTPATIENT)
Dept: CARDIOLOGY | Facility: CLINIC | Age: 60
End: 2025-06-17
Payer: COMMERCIAL

## 2025-06-17 ENCOUNTER — LAB (OUTPATIENT)
Facility: HOSPITAL | Age: 60
End: 2025-06-17
Payer: COMMERCIAL

## 2025-06-17 ENCOUNTER — TELEPHONE (OUTPATIENT)
Dept: CARDIOLOGY | Facility: CLINIC | Age: 60
End: 2025-06-17

## 2025-06-17 VITALS
DIASTOLIC BLOOD PRESSURE: 84 MMHG | WEIGHT: 300 LBS | HEART RATE: 80 BPM | SYSTOLIC BLOOD PRESSURE: 138 MMHG | BODY MASS INDEX: 37.5 KG/M2 | OXYGEN SATURATION: 97 %

## 2025-06-17 DIAGNOSIS — E78.2 MIXED HYPERLIPIDEMIA: Primary | ICD-10-CM

## 2025-06-17 DIAGNOSIS — E78.2 MIXED HYPERLIPIDEMIA: ICD-10-CM

## 2025-06-17 DIAGNOSIS — E78.5 DYSLIPIDEMIA: ICD-10-CM

## 2025-06-17 PROCEDURE — 93000 ELECTROCARDIOGRAM COMPLETE: CPT | Performed by: INTERNAL MEDICINE

## 2025-06-17 PROCEDURE — 83695 ASSAY OF LIPOPROTEIN(A): CPT

## 2025-06-17 PROCEDURE — 99204 OFFICE O/P NEW MOD 45 MIN: CPT | Performed by: INTERNAL MEDICINE

## 2025-06-17 PROCEDURE — 36415 COLL VENOUS BLD VENIPUNCTURE: CPT

## 2025-06-17 RX ORDER — METOPROLOL TARTRATE 50 MG
TABLET ORAL
Qty: 2 TABLET | Refills: 0 | Status: SHIPPED | OUTPATIENT
Start: 2025-06-17

## 2025-06-17 NOTE — PROGRESS NOTES
[facility] Cardiology  Consultation H&P  Guanaco Herman  1965    There is no work phone number on file..    VISIT DATE:  06/17/2025    PCP: Ramesh Mcgrath  [address] [place] [address]    CC:  Chief Complaint   Patient presents with    [facility]     New Patient    Cardiac Clearance           ASSESSMENT:   Diagnosis Plan   1. Mixed hyperlipidemia  Lipoprotein A (LPA)    CT Cardiac Calcium Score Without Dye      2. Dyslipidemia              PLAN:  Dyslipidemia: Goal LDL less than 100.  Currently well-controlled.  Continue rosuvastatin 10 mg p.o. daily.  Discussed a heart healthy, predominant plant-based diet.  LP(a) pending.  Coronary calcium score pending to help with further cardiac risk stratification.  Currently no clinical evidence of underlying obstructive coronary artery disease.  Unlikely to benefit from low-dose aspirin from a primary prevention standpoint given that  the majority of his risk factors for cardiovascular disease are well-controlled.    History of Present Illness   60-year-old gentleman with  hyperlipidemia and family history of coronary artery disease.  Father developed his first myocardial infarction at the age of 45, he was a smoker and subsequently had 3 more heart attacks, stopped smoking and lived to be the age of 88.  he has a brother that required surgical revascularization in his 50s.  That he is a non-smoker and nondiabetic.  Blood pressures at home run less than 130/80 mmHg.  Has a well-documented history of mild whitecoat hypertension.  Compliant with medical therapy.  Denies chest discomfort, dyspnea or palpitations.  Reports that his diet could use some work with regard to overall heart health.    PHYSICAL EXAMINATION:  Vitals:    06/17/25 0806   BP: 138/84   BP Location: Right arm   Patient Position: Sitting   Cuff Size: Adult   Pulse: 80   SpO2: 97%   Weight: 136 kg (300 lb)     General Appearance:    Alert, cooperative, no  "distress, appears stated age   Head:    Normocephalic, without obvious abnormality, atraumatic   Eyes:    conjunctiva/corneas clear, EOM's intact, fundi     benign, both eyes   Ears:    Normal TM's and external ear canals, both ears   Nose:   Nares normal, septum midline, mucosa normal, no drainage    or sinus tenderness   Throat:   Lips, mucosa, and tongue normal; teeth and gums normal   Neck:   Supple, symmetrical, trachea midline, no adenopathy;     thyroid:  no enlargement/tenderness/nodules; no carotid    bruit or JVD   Back:     Symmetric, no curvature, ROM normal, no CVA tenderness   Lungs:     Clear to auscultation bilaterally, respirations unlabored   Chest Wall:    No tenderness or deformity    Heart:    Regular rate and rhythm, S1 and S2 normal, no murmur, rub   or gallop, normal carotid impulse bilaterally without bruit.   Abdomen:     Soft, non-tender, bowel sounds active all four quadrants,     no masses, no organomegaly   Extremities:   Extremities normal, atraumatic, no cyanosis or edema   Pulses:   2+ and symmetric all extremities   Skin:   Skin color, texture, turgor normal, no rashes or lesions   Lymph nodes:   Cervical, supraclavicular, and axillary nodes normal   Neurologic:   normal strength, sensation intact     throughout       Diagnostic Data:    ECG 12 Lead    Date/Time: 6/17/2025 8:44 AM  Performed by: Mamadou De La Rosa III, MD    Authorized by: Mamadou De La Rosa III, MD  Previous ECG: no previous ECG available  Rhythm: sinus rhythm    Clinical impression: normal ECG        Lab Results   Component Value Date    TRIG 182 (H) 01/04/2021    HDL 57 01/04/2021     Lab Results   Component Value Date    GLUCOSE 85 05/13/2025    BUN 16 05/13/2025    CREATININE 0.83 05/13/2025     05/13/2025    K 4.2 05/13/2025     05/13/2025    CO2 26.1 05/13/2025     No results found for: \"HGBA1C\"  Lab Results   Component Value Date    WBC 7.32 05/13/2025    HGB 11.4 (L) 05/13/2025    HCT 35.4 (L) 05/13/2025 "     05/13/2025       PROBLEM LIST:  Patient Active Problem List   Diagnosis    Screen for colon cancer    Primary osteoarthritis involving multiple joints    Polyarthralgia    Dyslipidemia    Elevated blood pressure reading without diagnosis of hypertension    Generalized anxiety disorder       PAST MEDICAL HX  Past Medical History:   Diagnosis Date    Allergic     Seasonal    Ankle sprain 1980    3 or 4 from sports but nothing in the last 20 years.    Anxiety     Colon polyp 8/22    Constipation     Dislocation of finger 10/2016    Same time as fingers were fractured    Elevated cholesterol     Family history of heart attack     Family history of heart disease     Fracture, finger 10/2016    Right hand pinky & index.    GERD (gastroesophageal reflux disease) 2020    Pantoprazole    Hemorrhoid     History of cardiovascular stress test     REPORTS HX OF EXERCISE STRESS TEST AROUND 2014.  REPORTS ALL WAS WNL'S.    History of fracture     REPORTS AT 18 MONTHS OF AGE HE HAD BILATERAL LEGS BROKEN AND RESET    History of kidney stones     REPORTS NO SURGICAL INTERVENTION WAS REQUIRED    Low back pain 10/2024    More leg but also lower back    Low back strain 1995    Excessive lifting i would say 4-6 times    Obesity 1975    Rectal bleeding     Seasonal allergies     Stress fracture 2000    Three times. Left foot once aand right foot twice. Outside metatarsal.    Tennis elbow 2005    Twice    Visual impairment 2000    Glasses    Wears glasses     Wears partial dentures     PARTIAL PLATE UPPER MOUTH - INSTRUCTED NO ADHESIVES THE DOS       Allergies  No Known Allergies    Current Medications    Current Outpatient Medications:     B Complex Vitamins (VITAMIN B COMPLEX) capsule capsule, Take 1 capsule by mouth Daily., Disp: , Rfl:     bisacodyl (Dulcolax) 5 MG EC tablet, Take 2 @ 3pm, 2 @ 7 pm day prior to colonoscopy (Patient taking differently: Take 2 @ 3pm, 2 @ 7 pm day prior to colonoscopy/ scheduling once Cardiac  cleared), Disp: 4 tablet, Rfl: 0    Calcium Carbonate-Vitamin D (CALCIUM 500/D PO), Take 1,000 mg by mouth Daily., Disp: , Rfl:     Cholecalciferol (VITAMIN D3 PO), Take 600 mg by mouth Every Morning., Disp: , Rfl:     Multiple Vitamins-Minerals (MULTIVITAMIN WITH MINERALS) tablet tablet, Take 1 tablet by mouth Daily., Disp: , Rfl:     PARoxetine (PAXIL) 10 MG tablet, Take 1 tablet by mouth Every Morning., Disp: 90 tablet, Rfl: 3    rosuvastatin (CRESTOR) 10 MG tablet, Take 1 tablet by mouth Daily., Disp: 90 tablet, Rfl: 3         ROS  ROS      SOCIAL HX  Social History     Socioeconomic History    Marital status:    Tobacco Use    Smoking status: Never     Passive exposure: Never    Smokeless tobacco: Current     Types: Chew    Tobacco comments:     Smokeless tobacco since 1985   Vaping Use    Vaping status: Never Used   Substance and Sexual Activity    Alcohol use: No    Drug use: No    Sexual activity: Yes     Partners: Female     Birth control/protection: None       FAMILY HX  Family History   Problem Relation Age of Onset    Cancer Mother         She had cancer surgery in 1965. She has been cancer feet since    Diabetes Mother     Heart attack Father     Heart disease Father         Father had 2 massive heart attacks 1980 and 1986 also test showed 2 minor heart attacks one before 1984 and one between 6772-3907. Father passed away 7/24/2022.    Hyperlipidemia Father     Hypertension Father     COPD Father     Atrial fibrillation Father     Heart disease Brother         Open heart surgery around 2010. Still alive.    Hyperlipidemia Brother     Hypertension Brother     Stroke Brother         Around 2018    Diabetes Brother              Mamadou De La Rosa III, MD, [facility]

## 2025-06-17 NOTE — TELEPHONE ENCOUNTER
Requesting a medication to lower his heart rate before his CT Cardiac Calcium Score. It is scheduled Thursday. HR today was 80. Please advise.

## 2025-06-18 LAB — LPA SERPL-SCNC: 122 NMOL/L

## 2025-06-20 ENCOUNTER — TELEPHONE (OUTPATIENT)
Dept: ORTHOPEDIC SURGERY | Facility: CLINIC | Age: 60
End: 2025-06-20
Payer: COMMERCIAL

## 2025-06-20 ENCOUNTER — OFFICE VISIT (OUTPATIENT)
Dept: ORTHOPEDIC SURGERY | Facility: CLINIC | Age: 60
End: 2025-06-20
Payer: COMMERCIAL

## 2025-06-20 VITALS
WEIGHT: 302 LBS | BODY MASS INDEX: 37.55 KG/M2 | SYSTOLIC BLOOD PRESSURE: 140 MMHG | HEIGHT: 75 IN | DIASTOLIC BLOOD PRESSURE: 96 MMHG

## 2025-06-20 DIAGNOSIS — G89.29 CHRONIC PAIN OF BOTH HIPS: ICD-10-CM

## 2025-06-20 DIAGNOSIS — M25.552 CHRONIC PAIN OF BOTH HIPS: ICD-10-CM

## 2025-06-20 DIAGNOSIS — M25.551 CHRONIC PAIN OF BOTH HIPS: ICD-10-CM

## 2025-06-20 DIAGNOSIS — M25.551 GREATER TROCHANTERIC PAIN SYNDROME OF BOTH LOWER EXTREMITIES: Primary | ICD-10-CM

## 2025-06-20 DIAGNOSIS — M25.552 GREATER TROCHANTERIC PAIN SYNDROME OF BOTH LOWER EXTREMITIES: Primary | ICD-10-CM

## 2025-06-20 DIAGNOSIS — M25.551 ARTHRALGIA OF RIGHT HIP: ICD-10-CM

## 2025-06-20 NOTE — PROGRESS NOTES
"    Office Note     Name: Guanaco Herman    : 1965     MRN: [id number]     Chief Complaint  Pain of the Right Hip (Reports pain for 6 months, no known injury. Reports after prolong sitting he is extremely stiff. Unable to lay on side due to pain.)    Subjective     History of Present Illness:  Guanaco Herman is a 60 y.o. male presenting today for the evaluation of {MAMI Duration:75159::\"acute\"} ***, ongoing for ***.      Review of Systems     Past Medical History:   Diagnosis Date    Allergic     Seasonal    Ankle sprain     3 or 4 from sports but nothing in the last 20 years.    Anxiety     Colon polyp     Constipation     Dislocation of finger 10/2016    Same time as fingers were fractured    Elevated cholesterol     Family history of heart attack     Family history of heart disease     Fracture, finger 10/2016    Right hand pinky & index.    GERD (gastroesophageal reflux disease)     Pantoprazole    Hemorrhoid     History of cardiovascular stress test     REPORTS HX OF EXERCISE STRESS TEST AROUND .  REPORTS ALL WAS WNL'S.    History of fracture     REPORTS AT 18 MONTHS OF AGE HE HAD BILATERAL LEGS BROKEN AND RESET    History of kidney stones     REPORTS NO SURGICAL INTERVENTION WAS REQUIRED    Low back pain 10/2024    More leg but also lower back    Low back strain     Excessive lifting i would say 4-6 times    Obesity 1975    Rectal bleeding     Seasonal allergies     Stress fracture     Three times. Left foot once aand right foot twice. Outside metatarsal.    Tennis elbow     Twice    Visual impairment     Glasses    Wears glasses     Wears partial dentures     PARTIAL PLATE UPPER MOUTH - INSTRUCTED NO ADHESIVES THE DOS        Past Surgical History:   Procedure Laterality Date    BARIATRIC SURGERY      Have maintained 100lb lose since surgery    COLONOSCOPY N/A 2019    Procedure: COLONOSCOPY with hot forcep polypectomy;  Surgeon: Ahmet Thomas MD;  Location: " [facility] ENDOSCOPY;  Service: Gastroenterology    COLONOSCOPY N/A 06/29/2022    Procedure: COLONOSCOPY, polypectomy;  Surgeon: Ahmet Thomas MD;  Location: [facility] ENDOSCOPY;  Service: Gastroenterology;  Laterality: N/A;    ENDOSCOPY      GASTRIC BYPASS  06/27/2022    GASTRIC SLEEVE LAPAROSCOPIC  2014    HAND SURGERY Right     two fingers smashed and repair. 2013. Dr. Arango.       Family History   Problem Relation Age of Onset    Cancer Mother         She had cancer surgery in 1965. She has been cancer feet since    Diabetes Mother     Heart attack Father     Heart disease Father         Father had 2 massive heart attacks 1980 and 1986 also test showed 2 minor heart attacks one before 1984 and one between 0665-5895. Father passed away 7/24/2022.    Hyperlipidemia Father     Hypertension Father     COPD Father     Atrial fibrillation Father     Heart disease Brother         Open heart surgery around 2010. Still alive.    Hyperlipidemia Brother     Hypertension Brother     Stroke Brother         Around 2018    Diabetes Brother        Social History     Socioeconomic History    Marital status:    Tobacco Use    Smoking status: Never     Passive exposure: Never    Smokeless tobacco: Current     Types: Chew    Tobacco comments:     Smokeless tobacco since 1985   Vaping Use    Vaping status: Never Used   Substance and Sexual Activity    Alcohol use: No    Drug use: No    Sexual activity: Yes     Partners: Female     Birth control/protection: None         Current Outpatient Medications:     B Complex Vitamins (VITAMIN B COMPLEX) capsule capsule, Take 1 capsule by mouth Daily., Disp: , Rfl:     bisacodyl (Dulcolax) 5 MG EC tablet, Take 2 @ 3pm, 2 @ 7 pm day prior to colonoscopy (Patient taking differently: Take 2 @ 3pm, 2 @ 7 pm day prior to colonoscopy/ scheduling once Cardiac cleared), Disp: 4 tablet, Rfl: 0    Calcium Carbonate-Vitamin D (CALCIUM 500/D PO), Take 1,000 mg by mouth Daily., Disp: , Rfl:      "Cholecalciferol (VITAMIN D3 PO), Take 600 mg by mouth Every Morning., Disp: , Rfl:     metoprolol tartrate (LOPRESSOR) 50 MG tablet, Take one tablet by mouth the evening before the procedure and one tablet  by mouth the morning of the procedure., Disp: 2 tablet, Rfl: 0    Multiple Vitamins-Minerals (MULTIVITAMIN WITH MINERALS) tablet tablet, Take 1 tablet by mouth Daily., Disp: , Rfl:     PARoxetine (PAXIL) 10 MG tablet, Take 1 tablet by mouth Every Morning., Disp: 90 tablet, Rfl: 3    rosuvastatin (CRESTOR) 10 MG tablet, Take 1 tablet by mouth Daily., Disp: 90 tablet, Rfl: 3    No Known Allergies        Objective   /96   Ht 190.5 cm (75\")   Wt (!) 137 kg (302 lb)   BMI 37.75 kg/m²            Physical Exam  Ortho Exam   Extremity DVT signs are {16TC Kylie:27984}     Independent Review of Radiographic Studies:    {JDimagin}    {MAMI Chart Review:43853}    Procedures    Assessment and Plan   Diagnoses and all orders for this visit:    1. Greater trochanteric pain syndrome of both lower extremities (Primary)  -     Ambulatory Referral to Physical Therapy for Evaluation & Treatment    2. Arthralgia of right hip  -     XR Hip With or Without Pelvis 2 - 3 View Right; Future    3. Chronic pain of both hips  -     Ambulatory Referral to Physical Therapy for Evaluation & Treatment       {MAMI Patient Advise:31964::\"Discussion of orthopedic goals\",\"Orthopedic activities reviewed and patient expressed appreciation\",\"Risk, benefits, and merits of treatment alternatives reviewed with the patient and questions answered\",\"Patient guided on mobility and conditioning exercises\"}    Recommendations/Plan:  {MAMI Recommendations/Plan:03279::\"Exercise, medications, injections, other patient advice, and return appointment as noted.\",\"Patient and/or guardian(s) were encouraged to call or return to the office for any issues or concerns.\"}      Return in about 2 months (around 2025) for Recheck, XOA lumbar spine flexion and " extension views .

## 2025-06-20 NOTE — TELEPHONE ENCOUNTER
Tried calling patient to schedule hip injections at the hospital, unable to LVM due to full mailbox. Tried to zackary wife as well, no answer unable to LVM.

## 2025-06-20 NOTE — PROGRESS NOTES
Office Note     Name: Guanaco Herman    : 1965     MRN: [id number]     Chief Complaint  Pain of the Right Hip (Reports pain for 6 months, no known injury. Reports after prolong sitting he is extremely stiff. Unable to lay on side due to pain.)    Subjective     History of Present Illness:  Guanaco Herman is a 60 y.o. male presenting today for the evaluation of chronic bilateral posterior hip/buttocks pain and stiffness ongoing for at least 6 months.  Patient states that the pain and stiffness have steadily been worsening.  He denies any inciting injury or event.  He notes the pain tends to radiate from his buttocks bilaterally into the lateral and anterior thighs.  He also notes occasional tingling in the right anterior thigh.  At this time he denies significant back pain.  He denies any previous injuries to his back or hips.  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  States that he is no longer able to help as much as he needs to because of his hip pain.  His symptoms are exacerbated mostly by prolonged sitting such as long car rides after which he notes significant stiffness and sharp pain whenever he stands and begins walking.  He notes after walking a bit his pain is improved.  He denies numbness in either lower extremity.        Review of Systems    Past Medical History:   Diagnosis Date    Allergic     Seasonal    Ankle sprain     3 or 4 from sports but nothing in the last 20 years.    Anxiety     Colon polyp     Constipation     Dislocation of finger 10/2016    Same time as fingers were fractured    Elevated cholesterol     Family history of heart attack     Family history of heart disease     Fracture, finger 10/2016    Right hand pinky & index.    GERD (gastroesophageal reflux disease) 2020    Pantoprazole    Hemorrhoid     History of cardiovascular stress test     REPORTS HX OF EXERCISE STRESS TEST AROUND .   REPORTS ALL WAS WNL'S.    History of fracture     REPORTS AT 18 MONTHS OF AGE HE HAD BILATERAL LEGS BROKEN AND RESET    History of kidney stones     REPORTS NO SURGICAL INTERVENTION WAS REQUIRED    Low back pain 10/2024    More leg but also lower back    Low back strain 1995    Excessive lifting i would say 4-6 times    Obesity 1975    Rectal bleeding     Seasonal allergies     Stress fracture 2000    Three times. Left foot once aand right foot twice. Outside metatarsal.    Tennis elbow 2005    Twice    Visual impairment 2000    Glasses    Wears glasses     Wears partial dentures     PARTIAL PLATE UPPER MOUTH - INSTRUCTED NO ADHESIVES THE DOS        Past Surgical History:   Procedure Laterality Date    BARIATRIC SURGERY  2012    Have maintained 100lb lose since surgery    COLONOSCOPY N/A 01/30/2019    Procedure: COLONOSCOPY with hot forcep polypectomy;  Surgeon: Ahmet Thomas MD;  Location: [facility] ENDOSCOPY;  Service: Gastroenterology    COLONOSCOPY N/A 06/29/2022    Procedure: COLONOSCOPY, polypectomy;  Surgeon: Ahmet Thomas MD;  Location: [facility] ENDOSCOPY;  Service: Gastroenterology;  Laterality: N/A;    ENDOSCOPY      GASTRIC BYPASS  06/27/2022    GASTRIC SLEEVE LAPAROSCOPIC  2014    HAND SURGERY Right     two fingers smashed and repair. 2013. Dr. Arango.       Family History   Problem Relation Age of Onset    Cancer Mother         She had cancer surgery in 1965. She has been cancer feet since    Diabetes Mother     Heart attack Father     Heart disease Father         Father had 2 massive heart attacks 1980 and 1986 also test showed 2 minor heart attacks one before 1984 and one between 3098-8176. Father passed away 7/24/2022.    Hyperlipidemia Father     Hypertension Father     COPD Father     Atrial fibrillation Father     Heart disease Brother         Open heart surgery around 2010. Still alive.    Hyperlipidemia Brother     Hypertension Brother     Stroke Brother         Around 2018    Diabetes  "Brother        Social History     Socioeconomic History    Marital status:    Tobacco Use    Smoking status: Never     Passive exposure: Never    Smokeless tobacco: Current     Types: Chew    Tobacco comments:     Smokeless tobacco since 1985   Vaping Use    Vaping status: Never Used   Substance and Sexual Activity    Alcohol use: No    Drug use: No    Sexual activity: Yes     Partners: Female     Birth control/protection: None         Current Outpatient Medications:     B Complex Vitamins (VITAMIN B COMPLEX) capsule capsule, Take 1 capsule by mouth Daily., Disp: , Rfl:     bisacodyl (Dulcolax) 5 MG EC tablet, Take 2 @ 3pm, 2 @ 7 pm day prior to colonoscopy (Patient taking differently: Take 2 @ 3pm, 2 @ 7 pm day prior to colonoscopy/ scheduling once Cardiac cleared), Disp: 4 tablet, Rfl: 0    Calcium Carbonate-Vitamin D (CALCIUM 500/D PO), Take 1,000 mg by mouth Daily., Disp: , Rfl:     Cholecalciferol (VITAMIN D3 PO), Take 600 mg by mouth Every Morning., Disp: , Rfl:     metoprolol tartrate (LOPRESSOR) 50 MG tablet, Take one tablet by mouth the evening before the procedure and one tablet  by mouth the morning of the procedure., Disp: 2 tablet, Rfl: 0    Multiple Vitamins-Minerals (MULTIVITAMIN WITH MINERALS) tablet tablet, Take 1 tablet by mouth Daily., Disp: , Rfl:     PARoxetine (PAXIL) 10 MG tablet, Take 1 tablet by mouth Every Morning., Disp: 90 tablet, Rfl: 3    rosuvastatin (CRESTOR) 10 MG tablet, Take 1 tablet by mouth Daily., Disp: 90 tablet, Rfl: 3    No Known Allergies      Objective   /96   Ht 190.5 cm (75\")   Wt (!) 137 kg (302 lb)   BMI 37.75 kg/m²            Physical Exam  Musculoskeletal:      Lumbar back: Negative right straight leg raise test and negative left straight leg raise test.       Right Hip Exam     Tenderness   The patient is experiencing tenderness in the posterior.    Range of Motion   The patient has normal right hip ROM.    Muscle Strength   The patient has normal " right hip strength.    Tests   WILDER: negative  Rosa: negative  Fadir:  Negative FADIR test    Other   Erythema: absent  Sensation: normal  Pulse: present    Comments:  Bilateral hips:   Neg SLS 30s   Neg external derotation test   Neg rosa's   Neg trochanter tenderness         Left Hip Exam     Tenderness   The patient is experiencing tenderness in the posterior.    Range of Motion   The patient has normal left hip ROM.    Muscle Strength   The patient has normal left hip strength.     Tests   WILDER: negative  Rosa: negative  Fadir:  Negative FADIR test    Other   Erythema: absent  Sensation: normal  Pulse: present      Back Exam     Tenderness   The patient is experiencing no tenderness.     Range of Motion   The patient has normal back ROM.    Muscle Strength   The patient has normal back strength.    Tests   Straight leg raise right: negative  Straight leg raise left: negative    Reflexes   Patellar:  Hyporeflexic  Biceps:  normal  Babinski's sign: normal     Other   Toe walk: normal  Heel walk: normal  Sensation: normal  Gait: normal   Erythema: no back redness           Extremity DVT signs are negative by clinical screen.     Independent Review of Radiographic Studies:    2 view plain films of the right hip were taken in the office today, for the evaluation of pain and joint condition, with no comparison views available.   No acute osseous abnormalities are seen.  There is mild degenerative change of the right hip joint consistent with joint space narrowing without significant osteophyte formation.    No prior notes or tests/imaging to review.     Procedures    Assessment and Plan   Diagnoses and all orders for this visit:    1. Greater trochanteric pain syndrome of both lower extremities (Primary)  -     Ambulatory Referral to Physical Therapy for Evaluation & Treatment    2. Arthralgia of right hip  -     XR Hip With or Without Pelvis 2 - 3 View Right; Future    3. Chronic pain of both hips  -     Ambulatory  Referral to Physical Therapy for Evaluation & Treatment  -     [place] Guided Pain Management Large Joint       Discussion of orthopedic goals  Orthopedic activities reviewed and patient expressed appreciation  Risk, benefits, and merits of treatment alternatives reviewed with the patient and questions answered  Patient guided on mobility and conditioning exercises  RICE, heat, and/or modalities as needed and beneficial  Advised patient to call or notify the office for any adverse effect from injection therapy  Physical therapy referral given.  Reduced physical activity as appropriate and avoid aggravating activities.   Weight bearing parameters reviewed.     Recommendations/Plan:  Exercise, medications, injections, other patient advice, and return appointment as noted.  Patient and/or guardian(s) were encouraged to call or return to the office for any issues or concerns.  At this time patient has signs and symptoms consistent with greater trochanteric pain syndrome of both hips, as well as possible posterior hip/buttocks pain from osteoarthritis.  At this time he will be given a course of physical therapy for rehabilitation as well as fluoroscopy guided hip injection into both hips for hopeful symptomatic relief.  Also encouraged daily use of a heating pad and home exercise plan with the patient.  I advised over-the-counter analgesics such as Tylenol or ibuprofen as needed for pain.  I advised follow-up with me in 2 months for reevaluation.  If symptoms fail to improve consider MRIs of the hip/pelvis, perhaps lumbar spine workup.    Differential Diagnoses:  Greater trochanteric pain syndrome, gluteal tendinopathy, hip OA, referred pain from back      Return in about 2 months (around 8/20/2025) for Recheck, XOA lumbar spine flexion and extension views .

## 2025-07-30 ENCOUNTER — TELEPHONE (OUTPATIENT)
Age: 60
End: 2025-07-30

## 2025-07-31 RX ORDER — PANTOPRAZOLE SODIUM 20 MG/1
20 TABLET, DELAYED RELEASE ORAL DAILY
Qty: 90 TABLET | Refills: 3 | Status: SHIPPED | OUTPATIENT
Start: 2025-07-31

## 2025-08-21 ENCOUNTER — TELEPHONE (OUTPATIENT)
Dept: SURGERY | Facility: CLINIC | Age: 60
End: 2025-08-21
Payer: COMMERCIAL

## 2025-08-26 ENCOUNTER — OFFICE VISIT (OUTPATIENT)
Dept: CARDIOLOGY | Facility: CLINIC | Age: 60
End: 2025-08-26
Payer: COMMERCIAL

## 2025-08-26 VITALS
BODY MASS INDEX: 38.17 KG/M2 | HEART RATE: 83 BPM | WEIGHT: 307 LBS | SYSTOLIC BLOOD PRESSURE: 128 MMHG | OXYGEN SATURATION: 98 % | HEIGHT: 75 IN | DIASTOLIC BLOOD PRESSURE: 70 MMHG

## 2025-08-26 DIAGNOSIS — E78.5 DYSLIPIDEMIA: Primary | ICD-10-CM

## 2025-08-26 DIAGNOSIS — R03.0 ELEVATED BLOOD PRESSURE READING WITHOUT DIAGNOSIS OF HYPERTENSION: ICD-10-CM

## 2025-08-26 PROCEDURE — 99213 OFFICE O/P EST LOW 20 MIN: CPT | Performed by: INTERNAL MEDICINE

## 2025-08-26 RX ORDER — CETIRIZINE HYDROCHLORIDE 10 MG/1
10 TABLET ORAL DAILY
COMMUNITY

## 2025-08-26 RX ORDER — SODIUM PHOSPHATE,MONO-DIBASIC 19G-7G/118
1 ENEMA (ML) RECTAL DAILY
COMMUNITY

## 2025-08-26 RX ORDER — LANOLIN ALCOHOL/MO/W.PET/CERES
400 CREAM (GRAM) TOPICAL DAILY
COMMUNITY

## (undated) DEVICE — PATIENT RETURN ELECTRODE, SINGLE-USE, CONTACT QUALITY MONITORING, ADULT, WITH 9FT CORD, FOR PATIENTS WEIGING OVER 33LBS. (15KG): Brand: MEGADYNE

## (undated) DEVICE — FRCP BIOP RADLJAW4 HOT 2.2X240 BX40

## (undated) DEVICE — Device

## (undated) DEVICE — HYBRID TUBING/CAP SET FOR OLYMPUS® SCOPES: Brand: ERBE

## (undated) DEVICE — QUICK CATCH IN-LINE SUCTION POLYP TRAP IS USED FOR SUCTION RETRIEVAL OF ENDOSCOPICALLY REMOVED POLYPS.

## (undated) DEVICE — GLV SURG SENSICARE W/ALOE PF LF 8.5 STRL

## (undated) DEVICE — PAD GRND REM POLYHESIVE A/ DISP

## (undated) DEVICE — VLV SXN AIR/H2O ORCAPOD3 1P/U STRL

## (undated) DEVICE — ENDOSCOPY PORT CONNECTOR FOR OLYMPUS® SCOPES: Brand: ERBE

## (undated) DEVICE — KT ORCA VLV SXN AIR/H2O W/SEAL 1P/U STRL

## (undated) DEVICE — ENDOGATOR AUXILIARY WATER JET CONNECTOR: Brand: ENDOGATOR

## (undated) DEVICE — JELLY,LUBE,STERILE,FLIP TOP,TUBE,2-OZ: Brand: MEDLINE

## (undated) DEVICE — LUBE JELLY PK/2.75GM STRL BX/144